# Patient Record
Sex: FEMALE | Race: WHITE | HISPANIC OR LATINO
[De-identification: names, ages, dates, MRNs, and addresses within clinical notes are randomized per-mention and may not be internally consistent; named-entity substitution may affect disease eponyms.]

---

## 2019-07-03 ENCOUNTER — APPOINTMENT (OUTPATIENT)
Dept: FAMILY MEDICINE | Facility: HOSPITAL | Age: 34
End: 2019-07-03

## 2019-07-03 ENCOUNTER — OUTPATIENT (OUTPATIENT)
Dept: OUTPATIENT SERVICES | Facility: HOSPITAL | Age: 34
LOS: 1 days | End: 2019-07-03
Payer: SELF-PAY

## 2019-07-03 ENCOUNTER — MED ADMIN CHARGE (OUTPATIENT)
Age: 34
End: 2019-07-03

## 2019-07-03 ENCOUNTER — RESULT CHARGE (OUTPATIENT)
Age: 34
End: 2019-07-03

## 2019-07-03 VITALS
RESPIRATION RATE: 14 BRPM | HEART RATE: 56 BPM | WEIGHT: 156 LBS | BODY MASS INDEX: 27.99 KG/M2 | DIASTOLIC BLOOD PRESSURE: 73 MMHG | TEMPERATURE: 98.2 F | SYSTOLIC BLOOD PRESSURE: 107 MMHG | OXYGEN SATURATION: 98 % | HEIGHT: 62.5 IN

## 2019-07-03 DIAGNOSIS — Z00.00 ENCOUNTER FOR GENERAL ADULT MEDICAL EXAMINATION W/OUT ABNORMAL FINDINGS: ICD-10-CM

## 2019-07-03 DIAGNOSIS — Z00.00 ENCOUNTER FOR GENERAL ADULT MEDICAL EXAMINATION WITHOUT ABNORMAL FINDINGS: ICD-10-CM

## 2019-07-03 LAB
BILIRUB UR QL STRIP: NORMAL
CLARITY UR: CLEAR
COLLECTION METHOD: NORMAL
GLUCOSE UR-MCNC: NORMAL
HCG UR QL: 0.2 EU/DL
HCG UR QL: NEGATIVE
HGB UR QL STRIP.AUTO: NORMAL
KETONES UR-MCNC: NORMAL
LEUKOCYTE ESTERASE UR QL STRIP: NORMAL
NITRITE UR QL STRIP: NORMAL
PH UR STRIP: 6.5
PROT UR STRIP-MCNC: NORMAL
QUALITY CONTROL: YES
SP GR UR STRIP: 1.02

## 2019-07-03 NOTE — REVIEW OF SYSTEMS
[Fever] : no fever [Discharge] : no discharge [Chills] : no chills [Fatigue] : no fatigue [Hearing Loss] : no hearing loss [Pain] : no pain [Itching] : no itching [Sore Throat] : no sore throat [Palpitations] : no palpitations [Chest Pain] : no chest pain [Shortness Of Breath] : no shortness of breath [Lower Ext Edema] : no lower extremity edema [Wheezing] : no wheezing [Abdominal Pain] : no abdominal pain [Cough] : no cough [Constipation] : no constipation [Vomiting] : no vomiting [Nausea] : no nausea [Dysuria] : dysuria [Headache] : no headache [Vaginal Discharge] : vaginal discharge [Negative] : Integumentary [Dizziness] : no dizziness

## 2019-07-03 NOTE — HISTORY OF PRESENT ILLNESS
[FreeTextEntry8] : 35 yo female last seen in clinic on 7/2015, h/o varicose veins, presenting to clinic today for __\par \par Pt is up-to-date on Pap smear - last pap smear,as well as HPV were performed on 7/2015 and neg x2.\par Last Td was in 2008. Would give Tdap today as no record of Tdap and pt is due to another TD/Tdap.\par  [[employer] ] : provided by [employer]   [FreeTextEntry1] : [id number] [de-identified] : 35 yo female last seen in clinic on 2015, with h/o varicose veins, prediabetes, pterygium of R eye, presenting to clinic today for physical exam, pt also with several complaints pertaining to her health. Pt requesting for a pap test, and mammogram exam. Pt last had Pap and HPV done on 2015 and were both negative. Pt is not yet due for another pap smear/HPV.\par 1. Pt reports no menstrual period for the past 2 months, usually gets her period every month, bleeding slightly heavy during menstrual period. Pt is sexually active with one partner, states that she uses protection, she is not trying to get pregnant, and also does not desire to become pregnant. Pt denies fatigue, changes in hair or skin.\par 2. Pt reports thick, white vaginal discharge, reports that it is always present around when she has her periods, however currently has the discharge, not associated with vaginal itch. Pt however reports pain with urination.\par 3. Pt states that she has been having left breast pain for the past 10 years since having her son. Has also noticed white milk-like discharge from the left nipple especially when she has her periods. Pt states that the discharge has malodorous scent to it. Given the long duration of sxs, pt wanted to bring it to a doctor's attention. No family history of breast cancer or ovarian cancer in close-related family. However reports ?ovarian cancer in a paternal cousin who  at age 18.\par \par \par PMH: none other than as stated above\par PSH: previous cesarian delivery\par Meds: denies currently being on any home meds\par Allergies: NKA\par Social hx: occasionally drinks alcohol, denies smoking or illicit drug use

## 2019-07-03 NOTE — ASSESSMENT
[FreeTextEntry1] : 33 yo female last seen in clinic on 7/2015, with h/o varicose veins, prediabetes, pterygium of R eye presenting for CPE and with complaints of absence of menstrual period x 2 months, c/o vaginal discharge with no other associated sxs, and Left breast pain x 10 yrs duration with noted nipple discharge in the past.\par \par \par #Lack of menstrual period x 2 months\par - In-office urine pregnancy test was done and negative\par - TSH lab ordered - lab script given to pt, advised to go to lab to have them drawn\par \par #Vaginal discharge/dysuria\par - Speculum exam was done, very little to no discharge noted - BV not sent as a result\par - GC/Chlamydia sent\par - In-office UA with all negative results on panel (negative nitrite, neg LE, neg protein, net ketone, neg bili, neg glucose)\par \par #Left breast pain/discharge\par - No palpable mass or nipple discharge noted on exam\par - Prolactin lab ordered - script given to pt, will f/u result\par \par \par #Health maintenance\par - Tdap given today\par - CBC. BMP. lipid panel, HbA1C, \par - Next due for pap smear in 07/2020\par \par \par Pt advised to RTC in 1 month for follow up.\par \par Discussed with Dr. Beard

## 2019-07-03 NOTE — PHYSICAL EXAM
[No Acute Distress] : no acute distress [Well Developed] : well developed [Well Nourished] : well nourished [Normal Sclera/Conjunctiva] : normal sclera/conjunctiva [EOMI] : extraocular movements intact [PERRL] : pupils equal round and reactive to light [Normal Oropharynx] : the oropharynx was normal [No Lymphadenopathy] : no lymphadenopathy [Supple] : supple [Thyroid Normal, No Nodules] : the thyroid was normal and there were no nodules present [No Respiratory Distress] : no respiratory distress  [Clear to Auscultation] : lungs were clear to auscultation bilaterally [Normal Rate] : normal rate  [Regular Rhythm] : with a regular rhythm [Normal S1, S2] : normal S1 and S2 [No Murmur] : no murmur heard [Pedal Pulses Present] : the pedal pulses are present [No Extremity Clubbing/Cyanosis] : no extremity clubbing/cyanosis [No Nipple Discharge] : no nipple discharge [Soft] : abdomen soft [Non Tender] : non-tender [Non-distended] : non-distended [Normal Bowel Sounds] : normal bowel sounds [External Female Genitalia] : normal external genitalia [Grossly Normal Strength/Tone] : grossly normal strength/tone [Normal Gait] : normal gait [No Rash] : no rash [No Focal Deficits] : no focal deficits [de-identified] : +varicose veins on b/l LE [Normal Affect] : the affect was normal [de-identified] : pterygium of right eye, not obscuring the pupil [de-identified] : speculum exam performed, chaperoned by Faith, very mild discharge, noncopious or malodorous [de-identified] : chaperoned by ANA Cuevas - inverted nipple b/l L>R, no palpable masses, non tender, no noted discharge from nipple of L breast

## 2019-07-03 NOTE — HISTORY OF PRESENT ILLNESS
[FreeTextEntry8] : 35 yo female last seen in clinic on 7/2015, h/o varicose veins, presenting to clinic today for __\par \par Pt is up-to-date on Pap smear - last pap smear,as well as HPV were performed on 7/2015 and neg x2.\par Last Td was in 2008. Would give Tdap today as no record of Tdap and pt is due to another TD/Tdap.\par  [[employer] ] : provided by [employer]   [FreeTextEntry1] : [id number] [de-identified] : 33 yo female last seen in clinic on 2015, with h/o varicose veins, prediabetes, pterygium of R eye, presenting to clinic today for physical exam, pt also with several complaints pertaining to her health. Pt requesting for a pap test, and mammogram exam. Pt last had Pap and HPV done on 2015 and were both negative. Pt is not yet due for another pap smear/HPV.\par 1. Pt reports no menstrual period for the past 2 months, usually gets her period every month, bleeding slightly heavy during menstrual period. Pt is sexually active with one partner, states that she uses protection, she is not trying to get pregnant, and also does not desire to become pregnant. Pt denies fatigue, changes in hair or skin.\par 2. Pt reports thick, white vaginal discharge, reports that it is always present around when she has her periods, however currently has the discharge, not associated with vaginal itch. Pt however reports pain with urination.\par 3. Pt states that she has been having left breast pain for the past 10 years since having her son. Has also noticed white milk-like discharge from the left nipple especially when she has her periods. Pt states that the discharge has malodorous scent to it. Given the long duration of sxs, pt wanted to bring it to a doctor's attention. No family history of breast cancer or ovarian cancer in close-related family. However reports ?ovarian cancer in a paternal cousin who  at age 18.\par \par \par PMH: none other than as stated above\par PSH: previous cesarian delivery\par Meds: denies currently being on any home meds\par Allergies: NKA\par Social hx: occasionally drinks alcohol, denies smoking or illicit drug use

## 2019-07-03 NOTE — PHYSICAL EXAM
[No Acute Distress] : no acute distress [Well Nourished] : well nourished [Normal Sclera/Conjunctiva] : normal sclera/conjunctiva [Well Developed] : well developed [EOMI] : extraocular movements intact [PERRL] : pupils equal round and reactive to light [Normal Oropharynx] : the oropharynx was normal [No Lymphadenopathy] : no lymphadenopathy [Supple] : supple [Thyroid Normal, No Nodules] : the thyroid was normal and there were no nodules present [No Respiratory Distress] : no respiratory distress  [Clear to Auscultation] : lungs were clear to auscultation bilaterally [Normal Rate] : normal rate  [Regular Rhythm] : with a regular rhythm [Normal S1, S2] : normal S1 and S2 [No Murmur] : no murmur heard [Pedal Pulses Present] : the pedal pulses are present [No Extremity Clubbing/Cyanosis] : no extremity clubbing/cyanosis [No Nipple Discharge] : no nipple discharge [Soft] : abdomen soft [Non Tender] : non-tender [Non-distended] : non-distended [Normal Bowel Sounds] : normal bowel sounds [External Female Genitalia] : normal external genitalia [Grossly Normal Strength/Tone] : grossly normal strength/tone [Normal Gait] : normal gait [No Rash] : no rash [No Focal Deficits] : no focal deficits [Normal Affect] : the affect was normal [de-identified] : pterygium of right eye, not obscuring the pupil [de-identified] : +varicose veins on b/l LE [de-identified] : chaperoned by ANA Cuevas - inverted nipple b/l L>R, no palpable masses, non tender, no noted discharge from nipple of L breast [de-identified] : speculum exam performed, chaperoned by Faith, very mild discharge, noncopious or malodorous

## 2019-07-03 NOTE — REVIEW OF SYSTEMS
[Fever] : no fever [Fatigue] : no fatigue [Chills] : no chills [Discharge] : no discharge [Itching] : no itching [Pain] : no pain [Sore Throat] : no sore throat [Hearing Loss] : no hearing loss [Chest Pain] : no chest pain [Palpitations] : no palpitations [Lower Ext Edema] : no lower extremity edema [Shortness Of Breath] : no shortness of breath [Wheezing] : no wheezing [Abdominal Pain] : no abdominal pain [Cough] : no cough [Constipation] : no constipation [Nausea] : no nausea [Vomiting] : no vomiting [Dysuria] : dysuria [Headache] : no headache [Vaginal Discharge] : vaginal discharge [Dizziness] : no dizziness [Negative] : Integumentary

## 2019-07-05 ENCOUNTER — RESULT REVIEW (OUTPATIENT)
Age: 34
End: 2019-07-05

## 2019-07-05 LAB
C TRACH RRNA SPEC QL NAA+PROBE: NOT DETECTED
N GONORRHOEA RRNA SPEC QL NAA+PROBE: NOT DETECTED
SOURCE AMPLIFICATION: NORMAL

## 2019-07-08 DIAGNOSIS — N89.8 OTHER SPECIFIED NONINFLAMMATORY DISORDERS OF VAGINA: ICD-10-CM

## 2019-07-08 DIAGNOSIS — N64.52 NIPPLE DISCHARGE: ICD-10-CM

## 2019-07-08 DIAGNOSIS — R30.0 DYSURIA: ICD-10-CM

## 2019-07-08 DIAGNOSIS — Z32.00 ENCOUNTER FOR PREGNANCY TEST, RESULT UNKNOWN: ICD-10-CM

## 2019-07-08 DIAGNOSIS — Z23 ENCOUNTER FOR IMMUNIZATION: ICD-10-CM

## 2019-07-09 PROCEDURE — 83036 HEMOGLOBIN GLYCOSYLATED A1C: CPT

## 2019-07-09 PROCEDURE — 84443 ASSAY THYROID STIM HORMONE: CPT

## 2019-07-09 PROCEDURE — G0463: CPT

## 2019-07-09 PROCEDURE — 80048 BASIC METABOLIC PNL TOTAL CA: CPT

## 2019-07-09 PROCEDURE — 80061 LIPID PANEL: CPT

## 2019-07-09 PROCEDURE — 84146 ASSAY OF PROLACTIN: CPT

## 2019-07-10 LAB
ANION GAP SERPL CALC-SCNC: 9 MMOL/L
BASOPHILS # BLD AUTO: 0.02 K/UL
BASOPHILS NFR BLD AUTO: 0.3 %
BUN SERPL-MCNC: 17 MG/DL
CALCIUM SERPL-MCNC: 9.1 MG/DL
CHLORIDE SERPL-SCNC: 105 MMOL/L
CHOLEST SERPL-MCNC: 187 MG/DL
CHOLEST/HDLC SERPL: 5.5 RATIO
CO2 SERPL-SCNC: 25 MMOL/L
CREAT SERPL-MCNC: 0.69 MG/DL
EOSINOPHIL # BLD AUTO: 0.1 K/UL
EOSINOPHIL NFR BLD AUTO: 1.7 %
ESTIMATED AVERAGE GLUCOSE: 169 MG/DL
GLUCOSE SERPL-MCNC: 155 MG/DL
HBA1C MFR BLD HPLC: 7.5 %
HCT VFR BLD CALC: 38.6 %
HDLC SERPL-MCNC: 34 MG/DL
HGB BLD-MCNC: 12.8 G/DL
IMM GRANULOCYTES NFR BLD AUTO: 0.3 %
LDLC SERPL CALC-MCNC: 114 MG/DL
LYMPHOCYTES # BLD AUTO: 3.01 K/UL
LYMPHOCYTES NFR BLD AUTO: 49.8 %
MAN DIFF?: NORMAL
MCHC RBC-ENTMCNC: 29.2 PG
MCHC RBC-ENTMCNC: 33.2 GM/DL
MCV RBC AUTO: 87.9 FL
MONOCYTES # BLD AUTO: 0.33 K/UL
MONOCYTES NFR BLD AUTO: 5.5 %
NEUTROPHILS # BLD AUTO: 2.57 K/UL
NEUTROPHILS NFR BLD AUTO: 42.4 %
PLATELET # BLD AUTO: 278 K/UL
POTASSIUM SERPL-SCNC: 4.3 MMOL/L
PROLACTIN SERPL-MCNC: 15.9 NG/ML
RBC # BLD: 4.39 M/UL
RBC # FLD: 12.2 %
SODIUM SERPL-SCNC: 139 MMOL/L
TRIGL SERPL-MCNC: 196 MG/DL
TSH SERPL-ACNC: 1.76 UIU/ML
WBC # FLD AUTO: 6.05 K/UL

## 2019-07-31 ENCOUNTER — OUTPATIENT (OUTPATIENT)
Dept: OUTPATIENT SERVICES | Facility: HOSPITAL | Age: 34
LOS: 1 days | End: 2019-07-31
Payer: SELF-PAY

## 2019-07-31 ENCOUNTER — APPOINTMENT (OUTPATIENT)
Dept: FAMILY MEDICINE | Facility: HOSPITAL | Age: 34
End: 2019-07-31

## 2019-07-31 VITALS
OXYGEN SATURATION: 98 % | WEIGHT: 159 LBS | BODY MASS INDEX: 28.62 KG/M2 | SYSTOLIC BLOOD PRESSURE: 119 MMHG | DIASTOLIC BLOOD PRESSURE: 79 MMHG | TEMPERATURE: 98.1 F | HEART RATE: 94 BPM | RESPIRATION RATE: 14 BRPM

## 2019-07-31 DIAGNOSIS — Z23 ENCOUNTER FOR IMMUNIZATION: ICD-10-CM

## 2019-07-31 DIAGNOSIS — Z87.898 PERSONAL HISTORY OF OTHER SPECIFIED CONDITIONS: ICD-10-CM

## 2019-07-31 DIAGNOSIS — N89.8 OTHER SPECIFIED NONINFLAMMATORY DISORDERS OF VAGINA: ICD-10-CM

## 2019-07-31 DIAGNOSIS — Z00.00 ENCOUNTER FOR GENERAL ADULT MEDICAL EXAMINATION WITHOUT ABNORMAL FINDINGS: ICD-10-CM

## 2019-07-31 DIAGNOSIS — Z32.00 ENCOUNTER FOR PREGNANCY TEST, RESULT UNKNOWN: ICD-10-CM

## 2019-07-31 DIAGNOSIS — Z30.431 ENCOUNTER FOR ROUTINE CHECKING OF INTRAUTERINE CONTRACEPTIVE DEVICE: ICD-10-CM

## 2019-07-31 DIAGNOSIS — N64.52 NIPPLE DISCHARGE: ICD-10-CM

## 2019-07-31 DIAGNOSIS — Z92.0 PERSONAL HISTORY OF CONTRACEPTION: ICD-10-CM

## 2019-07-31 DIAGNOSIS — R73.03 PREDIABETES.: ICD-10-CM

## 2019-07-31 PROCEDURE — G0463: CPT

## 2019-07-31 NOTE — COUNSELING
[Weight management counseling provided] : Weight management [Activity counseling provided] : activity [Healthy eating counseling provided] : healthy eating [Disease Management counseling provided] : disease management  [Walking] : Walking [Low Fat Diet] : Low fat diet [___ min/wk activity recom] : [unfilled] min/wk activity recommended [Decrease Portions] : Decrease food portions [Take medications as directed] : Take medications as directed [Good understanding] : Patient has a good understanding of lifestyle changes and the steps needed to achieve self management goals [Check feet daily] : Check feet daily [ - Behavioral Counseling for Obesity (Face-to-Face for 15 Minutes)] : Behavioral Counseling for Obesity (Face-to-Face for 15 Minutes)

## 2019-07-31 NOTE — COUNSELING
[Weight management counseling provided] : Weight management [Activity counseling provided] : activity [Healthy eating counseling provided] : healthy eating [Walking] : Walking [Disease Management counseling provided] : disease management  [Low Fat Diet] : Low fat diet [___ min/wk activity recom] : [unfilled] min/wk activity recommended [Decrease Portions] : Decrease food portions [Take medications as directed] : Take medications as directed [Check feet daily] : Check feet daily [Good understanding] : Patient has a good understanding of lifestyle changes and the steps needed to achieve self management goals [ - Behavioral Counseling for Obesity (Face-to-Face for 15 Minutes)] : Behavioral Counseling for Obesity (Face-to-Face for 15 Minutes)

## 2019-07-31 NOTE — ASSESSMENT
[FreeTextEntry1] : # Newly diagnosed Diabetes\par - Most likely type II; pt is overweight (BMI 28.6)\par - HbA1C of 7.5\par - Metformin 500mg twice daily (with breakfast and dinner) started - will titrate accordingly\par - Foot exam in clinic wnl\par - Nutrition referral, ophtho referral\par - Pneumococcal and Hep B vaccines today\par - Pt counseled extensively on Diabetes (what it is, complications that can be associated, management goals) and on healthy diet and exercise\par - Brochures regarding healthy food choices and Diabetes given to pt\par \par #HLD/low HDL\par - Lifestyle modifications as above\par \par RTC in 6 weeks for follow up\par

## 2019-07-31 NOTE — PHYSICAL EXAM
[No Rash] : no rash [Normal Gait] : normal gait [Alert and Oriented x3] : oriented to person, place, and time [de-identified] : o [No Acute Distress] : no acute distress [Well-Appearing] : well-appearing [Normal Sclera/Conjunctiva] : normal sclera/conjunctiva [PERRL] : pupils equal round and reactive to light [Fundoscopic Exam Performed] : fundoscopic ~T exam ~C was performed [EOMI] : extraocular movements intact [Supple] : supple [Normal Oropharynx] : the oropharynx was normal [No Lymphadenopathy] : no lymphadenopathy [Normal Rate] : normal rate  [Clear to Auscultation] : lungs were clear to auscultation bilaterally [Regular Rhythm] : with a regular rhythm [No Murmur] : no murmur heard [Normal S1, S2] : normal S1 and S2 [Normal] : soft, non-tender, non-distended, no masses palpated, no HSM and normal bowel sounds [Grossly Normal Strength/Tone] : grossly normal strength/tone [No Focal Deficits] : no focal deficits [Comprehensive Foot Exam Normal] : Right and left foot were examined and both feet are normal. No ulcers in either foot. Toes are normal and with full ROM.  Normal tactile sensation with monofilament testing throughout both feet [de-identified] : no abnormality noted on fundoscopic exam

## 2019-07-31 NOTE — HISTORY OF PRESENT ILLNESS
[Diabetes Mellitus] : Diabetes Mellitus [Understanding of foot care] : Patient expressed understanding of foot care [Most Recent A1C: ___] : Most recent A1C was [unfilled] [Miguelifestyle management] : lifestyle management [No therapy] : Patient is not on statin therapy [FreeTextEntry6] : 33 yo female, with h/o pterygium of the right eye, mild case of varicose veins of lower extremities, here for follow up. Pt was last seen on 7/3 for CPE and complaints of urinary sxs and nipple discharge which she reports have all resolved. Pt also with newly diagnosed DM-II (A1C of 7.5), HLD. Current BMI of 28.6. \par \par Extensive discussion was held with pt today regarding diagnoses of diabetes, what diabetes is and the management of it. Lifestyle modifications (exercise and diet) was discussed with pt. Pt understands and is willing to exercise more to reduce weight, and eat healthier. Papers on healthy foods, and on Diabetes were provided. \par Pt was also made aware about starting on metformin\par All of pt's questions were answered appropriately. [[employer] ] : provided by [employer]   [FreeTextEntry1] : [id number] [de-identified] : 35 yo female, with h/o pterygium of the right eye, mild case of varicose veins of lower extremities, here for follow up. Pt was last seen on 7/3 for CPE and complaints of urinary sxs and nipple discharge which she reports have all resolved. Pt also with newly diagnosed DM-II (A1C of 7.5), HLD. Current BMI of 28.6. \par \par Extensive discussion was held with pt today regarding diagnoses of diabetes, what diabetes is and the management of it. Lifestyle modifications (exercise and diet) was discussed with pt. Pt understands and is willing to exercise more to reduce weight, and eat healthier. Papers on healthy foods, and on Diabetes were provided. \par Pt was also made aware about starting on metformin\par All of pt's questions were answered appropriately.

## 2019-07-31 NOTE — HISTORY OF PRESENT ILLNESS
[Diabetes Mellitus] : Diabetes Mellitus [Understanding of foot care] : Patient expressed understanding of foot care [Most Recent A1C: ___] : Most recent A1C was [unfilled] [Miguelifestyle management] : lifestyle management [No therapy] : Patient is not on statin therapy [FreeTextEntry6] : 35 yo female, with h/o pterygium of the right eye, mild case of varicose veins of lower extremities, here for follow up. Pt was last seen on 7/3 for CPE and complaints of urinary sxs and nipple discharge which she reports have all resolved. Pt also with newly diagnosed DM-II (A1C of 7.5), HLD. Current BMI of 28.6. \par \par Extensive discussion was held with pt today regarding diagnoses of diabetes, what diabetes is and the management of it. Lifestyle modifications (exercise and diet) was discussed with pt. Pt understands and is willing to exercise more to reduce weight, and eat healthier. Papers on healthy foods, and on Diabetes were provided. \par Pt was also made aware about starting on metformin\par All of pt's questions were answered appropriately. [[employer] ] : provided by [employer]   [FreeTextEntry1] : [id number] [de-identified] : 35 yo female, with h/o pterygium of the right eye, mild case of varicose veins of lower extremities, here for follow up. Pt was last seen on 7/3 for CPE and complaints of urinary sxs and nipple discharge which she reports have all resolved. Pt also with newly diagnosed DM-II (A1C of 7.5), HLD. Current BMI of 28.6. \par \par Extensive discussion was held with pt today regarding diagnoses of diabetes, what diabetes is and the management of it. Lifestyle modifications (exercise and diet) was discussed with pt. Pt understands and is willing to exercise more to reduce weight, and eat healthier. Papers on healthy foods, and on Diabetes were provided. \par Pt was also made aware about starting on metformin\par All of pt's questions were answered appropriately.

## 2019-07-31 NOTE — PHYSICAL EXAM
[No Rash] : no rash [Normal Gait] : normal gait [Alert and Oriented x3] : oriented to person, place, and time [de-identified] : o [No Acute Distress] : no acute distress [Well-Appearing] : well-appearing [Normal Sclera/Conjunctiva] : normal sclera/conjunctiva [PERRL] : pupils equal round and reactive to light [EOMI] : extraocular movements intact [Fundoscopic Exam Performed] : fundoscopic ~T exam ~C was performed [Supple] : supple [No Lymphadenopathy] : no lymphadenopathy [Normal Oropharynx] : the oropharynx was normal [Clear to Auscultation] : lungs were clear to auscultation bilaterally [Normal Rate] : normal rate  [Regular Rhythm] : with a regular rhythm [Normal S1, S2] : normal S1 and S2 [No Murmur] : no murmur heard [Normal] : soft, non-tender, non-distended, no masses palpated, no HSM and normal bowel sounds [No Focal Deficits] : no focal deficits [Grossly Normal Strength/Tone] : grossly normal strength/tone [Comprehensive Foot Exam Normal] : Right and left foot were examined and both feet are normal. No ulcers in either foot. Toes are normal and with full ROM.  Normal tactile sensation with monofilament testing throughout both feet [de-identified] : no abnormality noted on fundoscopic exam

## 2019-08-01 DIAGNOSIS — E11.9 TYPE 2 DIABETES MELLITUS WITHOUT COMPLICATIONS: ICD-10-CM

## 2019-09-18 ENCOUNTER — APPOINTMENT (OUTPATIENT)
Dept: FAMILY MEDICINE | Facility: HOSPITAL | Age: 34
End: 2019-09-18

## 2019-09-18 ENCOUNTER — OUTPATIENT (OUTPATIENT)
Dept: OUTPATIENT SERVICES | Facility: HOSPITAL | Age: 34
LOS: 1 days | End: 2019-09-18

## 2019-09-18 DIAGNOSIS — E11.9 TYPE 2 DIABETES MELLITUS WITHOUT COMPLICATIONS: ICD-10-CM

## 2019-09-18 PROCEDURE — G0463: CPT

## 2019-09-19 DIAGNOSIS — E11.9 TYPE 2 DIABETES MELLITUS WITHOUT COMPLICATIONS: ICD-10-CM

## 2019-10-23 ENCOUNTER — MED ADMIN CHARGE (OUTPATIENT)
Age: 34
End: 2019-10-23

## 2019-10-23 ENCOUNTER — OUTPATIENT (OUTPATIENT)
Dept: OUTPATIENT SERVICES | Facility: HOSPITAL | Age: 34
LOS: 1 days | End: 2019-10-23
Payer: SELF-PAY

## 2019-10-23 ENCOUNTER — RESULT CHARGE (OUTPATIENT)
Age: 34
End: 2019-10-23

## 2019-10-23 ENCOUNTER — APPOINTMENT (OUTPATIENT)
Dept: FAMILY MEDICINE | Facility: HOSPITAL | Age: 34
End: 2019-10-23

## 2019-10-23 VITALS
TEMPERATURE: 97.7 F | HEART RATE: 61 BPM | RESPIRATION RATE: 16 BRPM | DIASTOLIC BLOOD PRESSURE: 65 MMHG | OXYGEN SATURATION: 98 % | BODY MASS INDEX: 28.08 KG/M2 | WEIGHT: 156 LBS | SYSTOLIC BLOOD PRESSURE: 98 MMHG

## 2019-10-23 DIAGNOSIS — Z00.00 ENCOUNTER FOR GENERAL ADULT MEDICAL EXAMINATION WITHOUT ABNORMAL FINDINGS: ICD-10-CM

## 2019-10-23 LAB — HBA1C MFR BLD HPLC: 7.4

## 2019-10-23 PROCEDURE — G0463: CPT

## 2019-10-23 NOTE — ASSESSMENT
[FreeTextEntry1] : #Health maintenance\par - Flu vaccine given today\par -Hepatitis B vaccine dose #2 given today. 4th dose to be given in 4 months\par - PPSV23 given today

## 2019-10-23 NOTE — PHYSICAL EXAM
[No Acute Distress] : no acute distress [Well Developed] : well developed [Well-Appearing] : well-appearing [PERRL] : pupils equal round and reactive to light [EOMI] : extraocular movements intact [Normal Oropharynx] : the oropharynx was normal [No Lymphadenopathy] : no lymphadenopathy [No Respiratory Distress] : no respiratory distress  [Supple] : supple [Clear to Auscultation] : lungs were clear to auscultation bilaterally [Normal Rate] : normal rate  [Regular Rhythm] : with a regular rhythm [Normal S1, S2] : normal S1 and S2 [No Murmur] : no murmur heard [Pedal Pulses Present] : the pedal pulses are present [No Extremity Clubbing/Cyanosis] : no extremity clubbing/cyanosis [Soft] : abdomen soft [Non Tender] : non-tender [Non-distended] : non-distended [No Masses] : no abdominal mass palpated [Normal Bowel Sounds] : normal bowel sounds [Grossly Normal Strength/Tone] : grossly normal strength/tone [No Rash] : no rash [No Focal Deficits] : no focal deficits [Normal Affect] : the affect was normal [Comprehensive Foot Exam Normal] : Right and left foot were examined and both feet are normal. No ulcers in either foot. Toes are normal and with full ROM.  Normal tactile sensation with monofilament testing throughout both feet [de-identified] : pterygium of media aspect of R eye, not obscuring pupil

## 2019-10-23 NOTE — HISTORY OF PRESENT ILLNESS
[Diabetes Mellitus] : Diabetes Mellitus [Patient was last seen on ___] : Patient was last seen on [unfilled] [[employer] ] : provided by [employer]   [No episodes] : No hypoglycemic episodes since the last visit. [Understanding of foot care] : Patient expressed understanding of foot care [Most Recent A1C: ___] : Most recent A1C was [unfilled] [FreeTextEntry6] : 33 yo female presenting today for DM checkup. Pt has no acute complaints, she denies any episodes of dizziness, n/v, abdominal pain at home. Pt denies any blurred vision.\par Currently taking Metformin 500mg BID\par Pt has 3lbs weight loss since last seen in July 2019. [FreeTextEntry1] : [id number] [TWNoteComboBox1] : [place]

## 2019-10-24 DIAGNOSIS — E11.9 TYPE 2 DIABETES MELLITUS WITHOUT COMPLICATIONS: ICD-10-CM

## 2023-12-12 ENCOUNTER — APPOINTMENT (OUTPATIENT)
Dept: FAMILY MEDICINE | Facility: HOSPITAL | Age: 38
End: 2023-12-12

## 2023-12-12 ENCOUNTER — OUTPATIENT (OUTPATIENT)
Dept: OUTPATIENT SERVICES | Facility: HOSPITAL | Age: 38
LOS: 1 days | End: 2023-12-12
Payer: SELF-PAY

## 2023-12-12 ENCOUNTER — MED ADMIN CHARGE (OUTPATIENT)
Age: 38
End: 2023-12-12

## 2023-12-12 VITALS
RESPIRATION RATE: 16 BRPM | TEMPERATURE: 98.6 F | BODY MASS INDEX: 27.97 KG/M2 | WEIGHT: 152 LBS | DIASTOLIC BLOOD PRESSURE: 68 MMHG | OXYGEN SATURATION: 97 % | SYSTOLIC BLOOD PRESSURE: 105 MMHG | HEIGHT: 62 IN | HEART RATE: 70 BPM

## 2023-12-12 DIAGNOSIS — N89.8 OTHER SPECIFIED NONINFLAMMATORY DISORDERS OF VAGINA: ICD-10-CM

## 2023-12-12 DIAGNOSIS — R68.82 DECREASED LIBIDO: ICD-10-CM

## 2023-12-12 DIAGNOSIS — Z00.00 ENCOUNTER FOR GENERAL ADULT MEDICAL EXAMINATION WITHOUT ABNORMAL FINDINGS: ICD-10-CM

## 2023-12-12 DIAGNOSIS — N94.10 UNSPECIFIED DYSPAREUNIA: ICD-10-CM

## 2023-12-12 DIAGNOSIS — N92.6 IRREGULAR MENSTRUATION, UNSPECIFIED: ICD-10-CM

## 2023-12-12 DIAGNOSIS — Z23 ENCOUNTER FOR IMMUNIZATION: ICD-10-CM

## 2023-12-14 LAB — HBA1C MFR BLD HPLC: 10.3

## 2023-12-15 PROBLEM — N89.8 VAGINAL IRRITATION: Status: ACTIVE | Noted: 2023-12-12

## 2023-12-15 PROBLEM — R68.82 LIBIDO, DECREASED: Status: ACTIVE | Noted: 2023-12-12

## 2023-12-16 NOTE — ASSESSMENT
[FreeTextEntry1] : -Labwork ordered during clinic visit today -CPE, pap and HPV to be done in followup visit

## 2023-12-16 NOTE — HISTORY OF PRESENT ILLNESS
[FreeTextEntry8] : 38 year old  female with PMH of Diabetes presenting for dyspareunia and intermittent vaginal irritation. Pt reports experiencing a decreased libido with burning pain during intercourse. Describes a decrease in vaginal fluids with dryness during intercourse. Currently menstruating at this time and would like to defer any pelvic exams for after her menstruation. Reports history of amenorrhea that occurred twice about three months ago. Reports prior use of IUD as form of contraception placed in  and removed in .   Of note, pt's last A1C was 7.5 on 19. Reports discontinuation of metformin due to experiencing headaches while using the medication. Denies polydipsia, polyuria and polyphagia.

## 2023-12-16 NOTE — PHYSICAL EXAM
[No Acute Distress] : no acute distress [Well Nourished] : well nourished [Well Developed] : well developed [Well-Appearing] : well-appearing [Normal Sclera/Conjunctiva] : normal sclera/conjunctiva [EOMI] : extraocular movements intact [Normal Outer Ear/Nose] : the outer ears and nose were normal in appearance [Supple] : supple [No Respiratory Distress] : no respiratory distress  [No Accessory Muscle Use] : no accessory muscle use [Clear to Auscultation] : lungs were clear to auscultation bilaterally [Normal Rate] : normal rate  [Regular Rhythm] : with a regular rhythm [Normal S1, S2] : normal S1 and S2 [No Murmur] : no murmur heard [Normal Gait] : normal gait [Normal Affect] : the affect was normal [Alert and Oriented x3] : oriented to person, place, and time [Normal Insight/Judgement] : insight and judgment were intact [de-identified] : pt decline during today's clinic visit

## 2023-12-16 NOTE — REVIEW OF SYSTEMS
[Vaginal Discharge] : vaginal discharge [Negative] : Neurological [Frequency] : no frequency [Dysmenorrhea] : no dysmenorrhea

## 2023-12-19 ENCOUNTER — APPOINTMENT (OUTPATIENT)
Dept: FAMILY MEDICINE | Facility: HOSPITAL | Age: 38
End: 2023-12-19

## 2023-12-19 ENCOUNTER — OUTPATIENT (OUTPATIENT)
Dept: OUTPATIENT SERVICES | Facility: HOSPITAL | Age: 38
LOS: 1 days | End: 2023-12-19
Payer: SELF-PAY

## 2023-12-19 VITALS
DIASTOLIC BLOOD PRESSURE: 71 MMHG | HEART RATE: 60 BPM | SYSTOLIC BLOOD PRESSURE: 102 MMHG | RESPIRATION RATE: 16 BRPM | TEMPERATURE: 98.7 F | WEIGHT: 149 LBS | BODY MASS INDEX: 27.25 KG/M2 | OXYGEN SATURATION: 98 %

## 2023-12-19 DIAGNOSIS — N92.6 IRREGULAR MENSTRUATION, UNSPECIFIED: ICD-10-CM

## 2023-12-19 DIAGNOSIS — N94.10 UNSPECIFIED DYSPAREUNIA: ICD-10-CM

## 2023-12-19 DIAGNOSIS — E11.9 TYPE 2 DIABETES MELLITUS WITHOUT COMPLICATIONS: ICD-10-CM

## 2023-12-19 DIAGNOSIS — Z12.4 ENCOUNTER FOR SCREENING FOR MALIGNANT NEOPLASM OF CERVIX: ICD-10-CM

## 2023-12-19 DIAGNOSIS — Z00.00 ENCOUNTER FOR GENERAL ADULT MEDICAL EXAMINATION WITHOUT ABNORMAL FINDINGS: ICD-10-CM

## 2023-12-19 NOTE — HISTORY OF PRESENT ILLNESS
[de-identified] : 38 year old  female with PMH of Diabetes presenting for f/u dyspareunia and intermittent vaginal irritation. Last visit , testing deferred due to menstruation. Pt reports experiencing decreased libido with burning pain during intercourse [id number]. Describes a decrease in vaginal fluids with dryness during intercourse. Denies abnormal vaginal discharge or relationship concerns. Pt states menses abnormal, every 1-2 months.  Patient denies fevers, chills, headaches, shortness of breath, abdominal pain, N/V, cough, dizziness, or weakness.

## 2023-12-19 NOTE — PHYSICAL EXAM
[External Female Genitalia] : normal external genitalia [Vagina] : normal vaginal exam [Cervix] : normal cervix [Uterus] : uterus was normal size, without masses or tenderness [Uterine Adnexae] : normal adnexa [Normal] : no rash [Normal Affect] : the affect was normal [Normal Insight/Judgement] : insight and judgment were intact

## 2023-12-19 NOTE — REVIEW OF SYSTEMS
[Poor Libido] : poor libido [Negative] : Heme/Lymph [Dysuria] : no dysuria [Hematuria] : no hematuria [Vaginal Discharge] : no vaginal discharge [FreeTextEntry8] : irregular menses

## 2023-12-21 DIAGNOSIS — B37.31 ACUTE CANDIDIASIS OF VULVA AND VAGINA: ICD-10-CM

## 2023-12-21 LAB
C TRACH RRNA SPEC QL NAA+PROBE: NOT DETECTED
CANDIDA VAG CYTO: DETECTED
G VAGINALIS+PREV SP MTYP VAG QL MICRO: NOT DETECTED
HPV HIGH+LOW RISK DNA PNL CVX: NOT DETECTED
N GONORRHOEA RRNA SPEC QL NAA+PROBE: NOT DETECTED
SOURCE AMPLIFICATION: NORMAL
T VAGINALIS VAG QL WET PREP: NOT DETECTED

## 2023-12-21 RX ORDER — FLUCONAZOLE 150 MG/1
150 TABLET ORAL
Qty: 1 | Refills: 0 | Status: ACTIVE | COMMUNITY
Start: 2023-12-21 | End: 1900-01-01

## 2023-12-23 PROCEDURE — 84146 ASSAY OF PROLACTIN: CPT

## 2023-12-23 PROCEDURE — 83002 ASSAY OF GONADOTROPIN (LH): CPT

## 2023-12-23 PROCEDURE — 36415 COLL VENOUS BLD VENIPUNCTURE: CPT

## 2023-12-23 PROCEDURE — G0463: CPT

## 2023-12-23 PROCEDURE — 84443 ASSAY THYROID STIM HORMONE: CPT

## 2023-12-23 PROCEDURE — 85025 COMPLETE CBC W/AUTO DIFF WBC: CPT

## 2023-12-23 PROCEDURE — 82670 ASSAY OF TOTAL ESTRADIOL: CPT

## 2023-12-23 PROCEDURE — 87800 DETECT AGNT MULT DNA DIREC: CPT

## 2023-12-23 PROCEDURE — 82043 UR ALBUMIN QUANTITATIVE: CPT

## 2023-12-23 PROCEDURE — 87624 HPV HI-RISK TYP POOLED RSLT: CPT

## 2023-12-23 PROCEDURE — 80053 COMPREHEN METABOLIC PANEL: CPT

## 2023-12-23 PROCEDURE — 83001 ASSAY OF GONADOTROPIN (FSH): CPT

## 2023-12-23 PROCEDURE — 80061 LIPID PANEL: CPT

## 2023-12-24 LAB
ALBUMIN SERPL ELPH-MCNC: 4.4 G/DL
ALP BLD-CCNC: 91 U/L
ALT SERPL-CCNC: 17 U/L
ANION GAP SERPL CALC-SCNC: 11 MMOL/L
AST SERPL-CCNC: 15 U/L
BASOPHILS # BLD AUTO: 0.03 K/UL
BASOPHILS NFR BLD AUTO: 0.6 %
BILIRUB SERPL-MCNC: 0.7 MG/DL
BUN SERPL-MCNC: 14 MG/DL
CALCIUM SERPL-MCNC: 9.2 MG/DL
CHLORIDE SERPL-SCNC: 103 MMOL/L
CHOLEST SERPL-MCNC: 207 MG/DL
CO2 SERPL-SCNC: 25 MMOL/L
CREAT SERPL-MCNC: 0.58 MG/DL
CREAT SPEC-SCNC: 50 MG/DL
EGFR: 119 ML/MIN/1.73M2
EOSINOPHIL # BLD AUTO: 0.05 K/UL
EOSINOPHIL NFR BLD AUTO: 1 %
ESTRADIOL SERPL-MCNC: 31 PG/ML
FSH SERPL-MCNC: 7.8 IU/L
GLUCOSE SERPL-MCNC: 219 MG/DL
HCT VFR BLD CALC: 41.7 %
HDLC SERPL-MCNC: 43 MG/DL
HGB BLD-MCNC: 14.2 G/DL
IMM GRANULOCYTES NFR BLD AUTO: 0.2 %
LDLC SERPL CALC-MCNC: 132 MG/DL
LH SERPL-ACNC: 12.9 IU/L
LYMPHOCYTES # BLD AUTO: 2.53 K/UL
LYMPHOCYTES NFR BLD AUTO: 50.2 %
MAN DIFF?: NORMAL
MCHC RBC-ENTMCNC: 29.2 PG
MCHC RBC-ENTMCNC: 34.1 GM/DL
MCV RBC AUTO: 85.8 FL
MICROALBUMIN 24H UR DL<=1MG/L-MCNC: <1.2 MG/DL
MICROALBUMIN/CREAT 24H UR-RTO: NORMAL MG/G
MONOCYTES # BLD AUTO: 0.24 K/UL
MONOCYTES NFR BLD AUTO: 4.8 %
NEUTROPHILS # BLD AUTO: 2.18 K/UL
NEUTROPHILS NFR BLD AUTO: 43.2 %
NONHDLC SERPL-MCNC: 164 MG/DL
PLATELET # BLD AUTO: 283 K/UL
POTASSIUM SERPL-SCNC: 4.3 MMOL/L
PROLACTIN SERPL-MCNC: 14.7 NG/ML
PROT SERPL-MCNC: 7.3 G/DL
RBC # BLD: 4.86 M/UL
RBC # FLD: 11.8 %
SODIUM SERPL-SCNC: 139 MMOL/L
TRIGL SERPL-MCNC: 181 MG/DL
TSH SERPL-ACNC: 1.03 UIU/ML
WBC # FLD AUTO: 5.04 K/UL

## 2023-12-27 LAB — CYTOLOGY CVX/VAG DOC THIN PREP: ABNORMAL

## 2024-01-04 ENCOUNTER — OUTPATIENT (OUTPATIENT)
Dept: OUTPATIENT SERVICES | Facility: HOSPITAL | Age: 39
LOS: 1 days | End: 2024-01-04
Payer: SELF-PAY

## 2024-01-04 ENCOUNTER — APPOINTMENT (OUTPATIENT)
Dept: FAMILY MEDICINE | Facility: HOSPITAL | Age: 39
End: 2024-01-04

## 2024-01-04 VITALS
TEMPERATURE: 98.1 F | DIASTOLIC BLOOD PRESSURE: 66 MMHG | WEIGHT: 151 LBS | RESPIRATION RATE: 16 BRPM | HEART RATE: 61 BPM | BODY MASS INDEX: 27.79 KG/M2 | HEIGHT: 62 IN | OXYGEN SATURATION: 100 % | SYSTOLIC BLOOD PRESSURE: 114 MMHG

## 2024-01-04 DIAGNOSIS — N89.8 OTHER SPECIFIED NONINFLAMMATORY DISORDERS OF VAGINA: ICD-10-CM

## 2024-01-04 DIAGNOSIS — M54.9 DORSALGIA, UNSPECIFIED: ICD-10-CM

## 2024-01-04 DIAGNOSIS — E66.3 OVERWEIGHT: ICD-10-CM

## 2024-01-04 DIAGNOSIS — N64.52 NIPPLE DISCHARGE: ICD-10-CM

## 2024-01-04 DIAGNOSIS — Z00.00 ENCOUNTER FOR GENERAL ADULT MEDICAL EXAMINATION WITHOUT ABNORMAL FINDINGS: ICD-10-CM

## 2024-01-04 LAB
HCG UR QL: NEGATIVE
QUALITY CONTROL: YES

## 2024-01-04 PROCEDURE — G0463: CPT

## 2024-01-04 PROCEDURE — 87800 DETECT AGNT MULT DNA DIREC: CPT

## 2024-01-04 RX ORDER — MULTIVIT-MIN/IRON/FOLIC ACID/K 18-600-40
50 MCG CAPSULE ORAL
Qty: 30 | Refills: 0 | Status: ACTIVE | COMMUNITY
Start: 2024-01-04 | End: 1900-01-01

## 2024-01-04 RX ORDER — METFORMIN HYDROCHLORIDE 1000 MG/1
1000 TABLET, COATED ORAL
Qty: 180 | Refills: 1 | Status: ACTIVE | COMMUNITY
Start: 2019-07-31 | End: 1900-01-01

## 2024-01-06 PROBLEM — M54.9 BACK PAIN: Status: ACTIVE | Noted: 2024-01-04

## 2024-01-06 PROBLEM — N64.52 NIPPLE DISCHARGE: Status: ACTIVE | Noted: 2024-01-04

## 2024-01-06 PROBLEM — N89.8 VAGINAL DISCHARGE: Status: ACTIVE | Noted: 2024-01-04

## 2024-01-06 LAB
C TRACH RRNA SPEC QL NAA+PROBE: NOT DETECTED
CANDIDA VAG CYTO: NOT DETECTED
G VAGINALIS+PREV SP MTYP VAG QL MICRO: NOT DETECTED
N GONORRHOEA RRNA SPEC QL NAA+PROBE: NOT DETECTED
SOURCE AMPLIFICATION: NORMAL
T VAGINALIS VAG QL WET PREP: NOT DETECTED

## 2024-01-06 NOTE — REVIEW OF SYSTEMS
[Frequency] : no frequency [Vaginal Discharge] : vaginal discharge [Dysmenorrhea] : no dysmenorrhea [Back Pain] : back pain [Negative] : Neurological

## 2024-01-06 NOTE — PHYSICAL EXAM
[No Acute Distress] : no acute distress [Well Nourished] : well nourished [Well Developed] : well developed [Well-Appearing] : well-appearing [Normal Sclera/Conjunctiva] : normal sclera/conjunctiva [EOMI] : extraocular movements intact [Normal Outer Ear/Nose] : the outer ears and nose were normal in appearance [Supple] : supple [No Respiratory Distress] : no respiratory distress  [No Accessory Muscle Use] : no accessory muscle use [Clear to Auscultation] : lungs were clear to auscultation bilaterally [Normal Rate] : normal rate  [Regular Rhythm] : with a regular rhythm [Normal S1, S2] : normal S1 and S2 [No Murmur] : no murmur heard [Normal Appearance] : normal in appearance [No Masses] : no palpable masses [No Nipple Discharge] : no nipple discharge [No Axillary Lymphadenopathy] : no axillary lymphadenopathy [Atrophy] : no atrophy [Discharge] : ~M discharge [Moderate] : moderate [Foul Smelling] : not foul smelling [White] : white [Watery] : watery [Erythematous] : no erythema [Dry Mucosa] : a moist mucosa [Foreign Body] : no foreign body in the vault [No Bleeding] : no active bleeding [Normal Gait] : normal gait [Normal Affect] : the affect was normal [Alert and Oriented x3] : oriented to person, place, and time [Normal Insight/Judgement] : insight and judgment were intact [de-identified] : +TTP on L lower back near L2-L4 region

## 2024-01-06 NOTE — HISTORY OF PRESENT ILLNESS
[FreeTextEntry8] : 38 year old  female with PMH of Diabetes presenting for nipple discharge and vaginal discharge. Pt states that when fluconazole was taken, she experienced nipple discharge afterwards. States that nipple discharge is not occurring at this time. States that fluconazole initially improved vaginal discharge but amount increased afterwards.  Of note, pt has experienced back pain for several weeks. Denies fevers, chills, fatigue, night sweats, chest pain, palpitations, SOB, cough, dyspnea on exertion, nausea, vomiting, diarrhea, urinary frequency or burning with urination.

## 2024-01-16 ENCOUNTER — OUTPATIENT (OUTPATIENT)
Dept: OUTPATIENT SERVICES | Facility: HOSPITAL | Age: 39
LOS: 1 days | End: 2024-01-16
Payer: SELF-PAY

## 2024-01-16 ENCOUNTER — APPOINTMENT (OUTPATIENT)
Dept: OPHTHALMOLOGY | Facility: HOSPITAL | Age: 39
End: 2024-01-16

## 2024-01-16 VITALS
OXYGEN SATURATION: 98 % | SYSTOLIC BLOOD PRESSURE: 104 MMHG | TEMPERATURE: 98.1 F | HEART RATE: 60 BPM | BODY MASS INDEX: 27.44 KG/M2 | WEIGHT: 150 LBS | DIASTOLIC BLOOD PRESSURE: 64 MMHG | RESPIRATION RATE: 16 BRPM

## 2024-01-16 DIAGNOSIS — E11.9 TYPE 2 DIABETES MELLITUS WITHOUT COMPLICATIONS: ICD-10-CM

## 2024-01-16 DIAGNOSIS — E11.9 TYPE 2 DIABETES MELLITUS W/OUT COMPLICATIONS: ICD-10-CM

## 2024-01-16 DIAGNOSIS — E11.65 TYPE 2 DIABETES MELLITUS WITH HYPERGLYCEMIA: ICD-10-CM

## 2024-01-16 DIAGNOSIS — Z00.00 ENCOUNTER FOR GENERAL ADULT MEDICAL EXAMINATION WITHOUT ABNORMAL FINDINGS: ICD-10-CM

## 2024-01-16 DIAGNOSIS — Z01.00 ENCOUNTER FOR EXAMINATION OF EYES AND VISION WITHOUT ABNORMAL FINDINGS: ICD-10-CM

## 2024-01-16 DIAGNOSIS — Z01.00 ENCOUNTER FOR EXAMINATION OF EYES AND VISION W/OUT ABNORMAL FINDINGS: ICD-10-CM

## 2024-01-16 DIAGNOSIS — E11.9 ENCOUNTER FOR EXAMINATION OF EYES AND VISION W/OUT ABNORMAL FINDINGS: ICD-10-CM

## 2024-01-16 PROCEDURE — G0463: CPT

## 2024-01-17 ENCOUNTER — NON-APPOINTMENT (OUTPATIENT)
Age: 39
End: 2024-01-17

## 2024-01-17 PROBLEM — Z01.00 ENCOUNTER FOR DIABETES TYPE 2 EYE EXAM: Status: ACTIVE | Noted: 2024-01-16

## 2024-01-17 NOTE — HISTORY OF PRESENT ILLNESS
[FreeTextEntry1] : DM eye exam [de-identified] : 38 year old female with PMH of T2DM presents for DM eye exam. Pt is currently complaint with her medications and states she wished to continue oral meds at this time.

## 2024-01-17 NOTE — INTERPRETER SERVICES
[Other: ______] : provided by ANNI [Interpreters_IDNumber] : [id number] [TWNoteComboBox1] : [place]

## 2024-01-18 ENCOUNTER — NON-APPOINTMENT (OUTPATIENT)
Age: 39
End: 2024-01-18

## 2024-01-27 ENCOUNTER — NON-APPOINTMENT (OUTPATIENT)
Age: 39
End: 2024-01-27

## 2024-02-27 ENCOUNTER — APPOINTMENT (OUTPATIENT)
Dept: FAMILY MEDICINE | Facility: HOSPITAL | Age: 39
End: 2024-02-27

## 2024-02-27 ENCOUNTER — RESULT CHARGE (OUTPATIENT)
Age: 39
End: 2024-02-27

## 2024-02-27 ENCOUNTER — OUTPATIENT (OUTPATIENT)
Dept: OUTPATIENT SERVICES | Facility: HOSPITAL | Age: 39
LOS: 1 days | End: 2024-02-27
Payer: SELF-PAY

## 2024-02-27 VITALS
WEIGHT: 151 LBS | TEMPERATURE: 98 F | HEART RATE: 77 BPM | SYSTOLIC BLOOD PRESSURE: 105 MMHG | RESPIRATION RATE: 16 BRPM | DIASTOLIC BLOOD PRESSURE: 72 MMHG | OXYGEN SATURATION: 98 % | BODY MASS INDEX: 27.62 KG/M2

## 2024-02-27 DIAGNOSIS — E11.65 TYPE 2 DIABETES MELLITUS WITH HYPERGLYCEMIA: ICD-10-CM

## 2024-02-27 DIAGNOSIS — Z00.00 ENCOUNTER FOR GENERAL ADULT MEDICAL EXAMINATION W/OUT ABNORMAL FINDINGS: ICD-10-CM

## 2024-02-27 DIAGNOSIS — Z00.00 ENCOUNTER FOR GENERAL ADULT MEDICAL EXAMINATION WITHOUT ABNORMAL FINDINGS: ICD-10-CM

## 2024-02-27 LAB — HBA1C MFR BLD HPLC: 9.6

## 2024-02-27 PROCEDURE — G0463: CPT

## 2024-02-27 PROCEDURE — 83036 HEMOGLOBIN GLYCOSYLATED A1C: CPT

## 2024-02-28 PROBLEM — E11.65 POORLY CONTROLLED TYPE 2 DIABETES MELLITUS: Status: ACTIVE | Noted: 2024-01-16

## 2024-02-28 PROBLEM — Z00.00 ANNUAL PHYSICAL EXAM: Status: ACTIVE | Noted: 2024-02-27

## 2024-02-28 NOTE — HISTORY OF PRESENT ILLNESS
[FreeTextEntry1] : CPE [de-identified] : 39 year old female with PMH of poorly controlled T2DM presents for CPE. Pt has no acute complaints at this time. Pt states she is complaint with her medications.

## 2024-02-28 NOTE — COUNSELING
[Fall prevention counseling provided] : Fall prevention counseling provided [Potential consequences of obesity discussed] : Potential consequences of obesity discussed [Encouraged to increase physical activity] : Encouraged to increase physical activity [Patient motivation] : Patient motivation

## 2024-02-28 NOTE — PHYSICAL EXAM
[No Lymphadenopathy] : no lymphadenopathy [Supple] : supple [Normal] : normal rate, regular rhythm, normal S1 and S2 and no murmur heard [No Varicosities] : no varicosities [Pedal Pulses Present] : the pedal pulses are present [No Edema] : there was no peripheral edema [Soft] : abdomen soft [Non Tender] : non-tender [Non-distended] : non-distended [No CVA Tenderness] : no CVA  tenderness [No Spinal Tenderness] : no spinal tenderness [No Joint Swelling] : no joint swelling [No Rash] : no rash [Deep Tendon Reflexes (DTR)] : deep tendon reflexes were 2+ and symmetric

## 2024-02-28 NOTE — HEALTH RISK ASSESSMENT
[Good] : ~his/her~  mood as  good [No] : No [No falls in past year] : Patient reported no falls in the past year [0] : 2) Feeling down, depressed, or hopeless: Not at all (0) [PHQ-2 Negative - No further assessment needed] : PHQ-2 Negative - No further assessment needed [None] : None [With Family] : lives with family [] :  [Never] : Never [Change in mental status noted] : No change in mental status noted [Language] : denies difficulty with language [Reports changes in hearing] : Reports no changes in hearing [Reports changes in dental health] : Reports no changes in dental health

## 2024-03-19 ENCOUNTER — APPOINTMENT (OUTPATIENT)
Dept: FAMILY MEDICINE | Facility: HOSPITAL | Age: 39
End: 2024-03-19

## 2024-03-20 ENCOUNTER — NON-APPOINTMENT (OUTPATIENT)
Age: 39
End: 2024-03-20

## 2024-05-21 ENCOUNTER — APPOINTMENT (OUTPATIENT)
Dept: OPHTHALMOLOGY | Facility: CLINIC | Age: 39
End: 2024-05-21
Payer: COMMERCIAL

## 2024-05-21 ENCOUNTER — NON-APPOINTMENT (OUTPATIENT)
Age: 39
End: 2024-05-21

## 2024-05-21 PROCEDURE — 92004 COMPRE OPH EXAM NEW PT 1/>: CPT

## 2024-07-31 ENCOUNTER — RESULT CHARGE (OUTPATIENT)
Age: 39
End: 2024-07-31

## 2024-07-31 ENCOUNTER — APPOINTMENT (OUTPATIENT)
Dept: FAMILY MEDICINE | Facility: HOSPITAL | Age: 39
End: 2024-07-31

## 2024-07-31 ENCOUNTER — OUTPATIENT (OUTPATIENT)
Dept: OUTPATIENT SERVICES | Facility: HOSPITAL | Age: 39
LOS: 1 days | End: 2024-07-31
Payer: COMMERCIAL

## 2024-07-31 VITALS
DIASTOLIC BLOOD PRESSURE: 61 MMHG | WEIGHT: 148 LBS | BODY MASS INDEX: 27.07 KG/M2 | TEMPERATURE: 98.4 F | HEART RATE: 61 BPM | RESPIRATION RATE: 14 BRPM | OXYGEN SATURATION: 98 % | SYSTOLIC BLOOD PRESSURE: 96 MMHG

## 2024-07-31 DIAGNOSIS — Z34.90 ENCOUNTER FOR SUPERVISION OF NORMAL PREGNANCY, UNSPECIFIED, UNSPECIFIED TRIMESTER: ICD-10-CM

## 2024-07-31 DIAGNOSIS — Z32.01 ENCOUNTER FOR PREGNANCY TEST, RESULT POSITIVE: ICD-10-CM

## 2024-07-31 DIAGNOSIS — Z87.898 PERSONAL HISTORY OF OTHER SPECIFIED CONDITIONS: ICD-10-CM

## 2024-07-31 DIAGNOSIS — Z87.39 PERSONAL HISTORY OF OTHER DISEASES OF THE MUSCULOSKELETAL SYSTEM AND CONNECTIVE TISSUE: ICD-10-CM

## 2024-07-31 DIAGNOSIS — N92.6 IRREGULAR MENSTRUATION, UNSPECIFIED: ICD-10-CM

## 2024-07-31 DIAGNOSIS — E11.65 TYPE 2 DIABETES MELLITUS WITH HYPERGLYCEMIA: ICD-10-CM

## 2024-07-31 DIAGNOSIS — E78.6 LIPOPROTEIN DEFICIENCY: ICD-10-CM

## 2024-07-31 DIAGNOSIS — Z87.42 PERSONAL HISTORY OF OTHER DISEASES OF THE FEMALE GENITAL TRACT: ICD-10-CM

## 2024-07-31 DIAGNOSIS — N89.8 OTHER SPECIFIED NONINFLAMMATORY DISORDERS OF VAGINA: ICD-10-CM

## 2024-07-31 LAB
HBA1C MFR BLD HPLC: 8.7
HCG UR QL: POSITIVE
QUALITY CONTROL: YES

## 2024-07-31 RX ORDER — PNV 119/IRON FUM/FOLIC ACID 29 MG-1 MG
TABLET ORAL DAILY
Qty: 90 | Refills: 0 | Status: ACTIVE | OUTPATIENT
Start: 2024-07-31

## 2024-08-01 NOTE — PHYSICAL EXAM
[Declined Breast Exam] : declined breast exam  [Normal Gait] : normal gait [Normal] : affect was normal and insight and judgment were intact [External Female Genitalia] : normal external genitalia [Vagina] : normal vaginal exam [Cervix] : normal cervix [Uterus] : uterus was normal size, without masses or tenderness [Adnexa Tenderness On The Right] : tenderness [Adnexa Tenderness On The Left] : no tenderness [___ cm] : no palpable mass [de-identified] :  scar noted [de-identified] : Chaperone: ANA Childs [FreeTextEntry1] : thin clear discharge noted

## 2024-08-01 NOTE — INTERPRETER SERVICES
[FreeTextEntry3] :  Patient declined  service. Patient felt comfortable speaking with provider in shared language, Occitan.

## 2024-08-01 NOTE — PHYSICAL EXAM
[Declined Breast Exam] : declined breast exam  [Normal Gait] : normal gait [Normal] : affect was normal and insight and judgment were intact [External Female Genitalia] : normal external genitalia [Vagina] : normal vaginal exam [Cervix] : normal cervix [Uterus] : uterus was normal size, without masses or tenderness [Adnexa Tenderness On The Right] : tenderness [Adnexa Tenderness On The Left] : no tenderness [___ cm] : no palpable mass [de-identified] :  scar noted [de-identified] : Chaperone: ANA Childs [FreeTextEntry1] : thin clear discharge noted

## 2024-08-01 NOTE — REVIEW OF SYSTEMS
[Frequency] : frequency [Vaginal Discharge] : vaginal discharge [Negative] : Heme/Lymph [Dysuria] : no dysuria [Hematuria] : no hematuria

## 2024-08-01 NOTE — HISTORY OF PRESENT ILLNESS
[FreeTextEntry8] : 39F PMH T2DM (last a1c 9.6% in 24, on metformin 1000mg bid) presents for positive home pregnancy test.  OBhx:  with h/o spont  at 4months GA required D+C (), pC/s due to arrest of descent at 7cm (). Pt has 16y/o son. and elective  ([facility] ). LMP , usually has menses every month but +/- 1 week. Pt states she took Plan B after last intercourse with her  as she did not want kids; usually uses condoms for contraception. Pt is unsure if she desires pregnancy, she wishes to discuss with her partner but agreed to proceed with US transvag/pelvic and take prenatal vitamins. Pt c/o breast tenderness, urinary frequency and vaginal discharge x 2 weeks. Denies fever, chills, nausea, vomiting, abdominal pain, bloody discharge, or dysuria.

## 2024-08-01 NOTE — INTERPRETER SERVICES
[FreeTextEntry3] :  Patient declined  service. Patient felt comfortable speaking with provider in shared language, Yi.

## 2024-08-01 NOTE — HISTORY OF PRESENT ILLNESS
[FreeTextEntry8] : 39F PMH T2DM (last a1c 9.6% in 24, on metformin 1000mg bid) presents for positive home pregnancy test.  OBhx:  with h/o spont  at 4months GA required D+C (), pC/s due to arrest of descent at 7cm (). Pt has 14y/o son. and elective  ([facility] ). LMP , usually has menses every month but +/- 1 week. Pt states she took Plan B after last intercourse with her  as she did not want kids; usually uses condoms for contraception. Pt is unsure if she desires pregnancy, she wishes to discuss with her partner but agreed to proceed with US transvag/pelvic and take prenatal vitamins. Pt c/o breast tenderness, urinary frequency and vaginal discharge x 2 weeks. Denies fever, chills, nausea, vomiting, abdominal pain, bloody discharge, or dysuria.

## 2024-08-02 DIAGNOSIS — E11.65 TYPE 2 DIABETES MELLITUS WITH HYPERGLYCEMIA: ICD-10-CM

## 2024-08-02 DIAGNOSIS — N92.6 IRREGULAR MENSTRUATION, UNSPECIFIED: ICD-10-CM

## 2024-08-02 DIAGNOSIS — Z32.01 ENCOUNTER FOR PREGNANCY TEST, RESULT POSITIVE: ICD-10-CM

## 2024-08-02 DIAGNOSIS — N89.8 OTHER SPECIFIED NONINFLAMMATORY DISORDERS OF VAGINA: ICD-10-CM

## 2024-08-02 PROCEDURE — 81002 URINALYSIS NONAUTO W/O SCOPE: CPT

## 2024-08-02 PROCEDURE — G0463: CPT

## 2024-08-02 PROCEDURE — 81025 URINE PREGNANCY TEST: CPT

## 2024-08-02 PROCEDURE — 83036 HEMOGLOBIN GLYCOSYLATED A1C: CPT

## 2024-08-06 ENCOUNTER — NON-APPOINTMENT (OUTPATIENT)
Age: 39
End: 2024-08-06

## 2024-08-10 PROBLEM — B37.31 CANDIDA VAGINITIS: Status: ACTIVE | Noted: 2024-08-10 | Resolved: 2024-09-09

## 2024-08-10 LAB — CYTOLOGY CVX/VAG DOC THIN PREP: ABNORMAL

## 2024-09-27 ENCOUNTER — RX RENEWAL (OUTPATIENT)
Age: 39
End: 2024-09-27

## 2025-08-27 ENCOUNTER — RESULT REVIEW (OUTPATIENT)
Age: 40
End: 2025-08-27

## 2025-08-27 ENCOUNTER — OUTPATIENT (OUTPATIENT)
Dept: OUTPATIENT SERVICES | Facility: HOSPITAL | Age: 40
LOS: 1 days | End: 2025-08-27
Payer: SELF-PAY

## 2025-08-27 ENCOUNTER — APPOINTMENT (OUTPATIENT)
Age: 40
End: 2025-08-27

## 2025-08-27 VITALS
HEART RATE: 58 BPM | DIASTOLIC BLOOD PRESSURE: 67 MMHG | WEIGHT: 153 LBS | BODY MASS INDEX: 27.98 KG/M2 | RESPIRATION RATE: 16 BRPM | SYSTOLIC BLOOD PRESSURE: 102 MMHG | TEMPERATURE: 98.1 F | OXYGEN SATURATION: 97 %

## 2025-08-27 DIAGNOSIS — Z00.00 ENCOUNTER FOR GENERAL ADULT MEDICAL EXAMINATION WITHOUT ABNORMAL FINDINGS: ICD-10-CM

## 2025-08-27 DIAGNOSIS — N64.52 NIPPLE DISCHARGE: ICD-10-CM

## 2025-08-27 PROCEDURE — G0463: CPT

## 2025-09-05 ENCOUNTER — RESULT REVIEW (OUTPATIENT)
Age: 40
End: 2025-09-05

## 2025-09-05 ENCOUNTER — APPOINTMENT (OUTPATIENT)
Dept: ULTRASOUND IMAGING | Facility: IMAGING CENTER | Age: 40
End: 2025-09-05

## 2025-09-05 ENCOUNTER — OUTPATIENT (OUTPATIENT)
Dept: OUTPATIENT SERVICES | Facility: HOSPITAL | Age: 40
LOS: 1 days | End: 2025-09-05
Payer: COMMERCIAL

## 2025-09-05 ENCOUNTER — APPOINTMENT (OUTPATIENT)
Dept: MAMMOGRAPHY | Facility: IMAGING CENTER | Age: 40
End: 2025-09-05

## 2025-09-05 DIAGNOSIS — N64.52 NIPPLE DISCHARGE: ICD-10-CM

## 2025-09-05 PROCEDURE — G0279: CPT

## 2025-09-05 PROCEDURE — 77066 DX MAMMO INCL CAD BI: CPT | Mod: 26

## 2025-09-05 PROCEDURE — 77066 DX MAMMO INCL CAD BI: CPT

## 2025-09-05 PROCEDURE — 76641 ULTRASOUND BREAST COMPLETE: CPT | Mod: 26,LT

## 2025-09-05 PROCEDURE — G0279: CPT | Mod: 26

## 2025-09-05 PROCEDURE — 76641 ULTRASOUND BREAST COMPLETE: CPT

## 2025-09-12 ENCOUNTER — MED ADMIN CHARGE (OUTPATIENT)
Age: 40
End: 2025-09-12

## 2025-09-12 ENCOUNTER — APPOINTMENT (OUTPATIENT)
Age: 40
End: 2025-09-12

## 2025-09-12 VITALS
HEART RATE: 61 BPM | BODY MASS INDEX: 27.8 KG/M2 | WEIGHT: 152 LBS | TEMPERATURE: 97.8 F | RESPIRATION RATE: 16 BRPM | DIASTOLIC BLOOD PRESSURE: 72 MMHG | SYSTOLIC BLOOD PRESSURE: 112 MMHG | OXYGEN SATURATION: 97 %

## 2025-09-12 DIAGNOSIS — E11.65 TYPE 2 DIABETES MELLITUS WITH HYPERGLYCEMIA: ICD-10-CM

## 2025-09-12 DIAGNOSIS — N64.52 NIPPLE DISCHARGE: ICD-10-CM

## 2025-09-12 DIAGNOSIS — Z13.5 ENCOUNTER FOR SCREENING FOR EYE AND EAR DISORDERS: ICD-10-CM

## 2025-09-12 DIAGNOSIS — Z23 ENCOUNTER FOR IMMUNIZATION: ICD-10-CM

## 2025-09-13 LAB
ALBUMIN, RANDOM URINE: <1.2 MG/DL
CREAT SPEC-SCNC: 70 MG/DL
MICROALBUMIN/CREAT 24H UR-RTO: NORMAL MG/G